# Patient Record
Sex: FEMALE | URBAN - NONMETROPOLITAN AREA
[De-identification: names, ages, dates, MRNs, and addresses within clinical notes are randomized per-mention and may not be internally consistent; named-entity substitution may affect disease eponyms.]

---

## 2017-04-20 ENCOUNTER — NURSE TRIAGE (OUTPATIENT)
Dept: ADMINISTRATIVE | Age: 62
End: 2017-04-20

## 2018-07-27 ENCOUNTER — NURSE TRIAGE (OUTPATIENT)
Dept: ADMINISTRATIVE | Age: 63
End: 2018-07-27

## 2018-07-28 NOTE — TELEPHONE ENCOUNTER
Reason for Disposition   Ran out of BP medications    Answer Assessment - Initial Assessment Questions  1. BLOOD PRESSURE: \"What is the blood pressure? \" \"Did you take at least two measurements 5 minutes apart?\"      143/80, 154/84, 154/92  2. ONSET: \"When did you take your blood pressure? \"      Today   3. HOW: \"How did you obtain the blood pressure? \" (e.g., visiting nurse, automatic home BP monitor)      Home monitor. 4. HISTORY: \"Do you have a history of high blood pressure? \"      Yes.   5. MEDICATIONS: Cristiane Nur you taking any medications for blood pressure? \" \"Have you missed any doses recently? \"      PCP told her to STOP her blood pressure medication until her Potassium lever is recheck. 6. OTHER SYMPTOMS: \"Do you have any symptoms? \" (e.g., headache, chest pain, blurred vision, difficulty breathing, weakness)      Irritated, face feels flushed. 7. PREGNANCY: \"Is there any chance you are pregnant? \" \"When was your last menstrual period? \"      No.     **Mary states her Potassium level is 2.8. She was given Potassium pills from her PCP. She stop taking the Potassium because she says she had a bad reaction to the pills. The PCP took her off her BP medication to see if her potassium levels would rise. Shea Kimball states she is concern about staying off her BP medication for five days. She fears her BP is going to keep going up. She states she likes the BP medication she is on and does not want to stop her medication. I advised Shea Kimball to call her physician in the morning, they are open until Noon. Shea Kimball states she will call the physician in the morning.     Protocols used: HIGH BLOOD PRESSURE-ADULT-AH